# Patient Record
Sex: MALE | Race: WHITE | Employment: FULL TIME | URBAN - METROPOLITAN AREA
[De-identification: names, ages, dates, MRNs, and addresses within clinical notes are randomized per-mention and may not be internally consistent; named-entity substitution may affect disease eponyms.]

---

## 2019-10-24 ENCOUNTER — OFFICE VISIT (OUTPATIENT)
Dept: CARDIOLOGY CLINIC | Age: 49
End: 2019-10-24

## 2019-10-24 ENCOUNTER — HOSPITAL ENCOUNTER (OUTPATIENT)
Dept: LAB | Age: 49
Discharge: HOME | End: 2019-10-24
Payer: COMMERCIAL

## 2019-10-24 VITALS
DIASTOLIC BLOOD PRESSURE: 60 MMHG | WEIGHT: 213 LBS | HEART RATE: 54 BPM | SYSTOLIC BLOOD PRESSURE: 110 MMHG | OXYGEN SATURATION: 96 % | HEIGHT: 72 IN | BODY MASS INDEX: 28.85 KG/M2

## 2019-10-24 DIAGNOSIS — I48.91 ATRIAL FIBRILLATION, UNSPECIFIED TYPE (HCC): ICD-10-CM

## 2019-10-24 DIAGNOSIS — R07.9 CHEST PAIN, UNSPECIFIED TYPE: ICD-10-CM

## 2019-10-24 DIAGNOSIS — I48.91 ATRIAL FIBRILLATION, UNSPECIFIED TYPE (HCC): Primary | ICD-10-CM

## 2019-10-24 LAB
T4 FREE SERPL-MCNC: 0.8 NG/DL (ref 0.7–1.5)
TSH SERPL DL<=0.05 MIU/L-ACNC: 1.54 UIU/ML (ref 0.36–3.74)

## 2019-10-24 PROCEDURE — 36415 COLL VENOUS BLD VENIPUNCTURE: CPT

## 2019-10-24 PROCEDURE — 84439 ASSAY OF FREE THYROXINE: CPT

## 2019-10-24 RX ORDER — VENLAFAXINE HYDROCHLORIDE 75 MG/1
CAPSULE, EXTENDED RELEASE ORAL
COMMUNITY
Start: 2019-10-10

## 2019-10-24 RX ORDER — TAMSULOSIN HYDROCHLORIDE 0.4 MG/1
CAPSULE ORAL
COMMUNITY
Start: 2019-01-11 | End: 2021-03-10

## 2019-10-24 RX ORDER — ATENOLOL 25 MG/1
25 TABLET ORAL DAILY
COMMUNITY
Start: 2018-12-06

## 2019-10-24 NOTE — PROGRESS NOTES
1. Have you been to the ER, urgent care clinic since your last visit? Hospitalized since your last visit? No    2. Have you seen or consulted any other health care providers outside of the [address] [place] [address] since your last visit? Include any pap smears or colon screening.  No

## 2019-10-24 NOTE — PROGRESS NOTES
[facility]    Mr. Samson Mayorga is a 52-year male with a history of hyperthyroidism, paroxysmal atrial fibrillation    Patient is here today to establish care with me. He denies any prior history of myocardial infarction or congestive heart failure. Recently he was diagnosed with atrial fibrillation and had to go to emergency department. Patient has very long-standing history of occasional palpitation on and off for last 20 years. For that reason he has been taking atenolol for palpitation. 2 months ago while he was on vacation, he had a sudden onset of tachycardia and irregular heart rhythm lasting for couple hours. He did not seek medical attention. This happened again couple weeks ago when he decided to go to emergency department where he was found to have atrial fibrillation with rapid medical response. He was converted back to normal spontaneously. Since then he did not have any more episode of palpitation, he denies any associated dizziness, presyncope or syncope during this episodes. Randomly he has some sharp pressure sensation in the chest in the center without any radiation or associated symptoms. This does not happen with exertion. He continues to jog up to 5 to 10 miles without any problem. He denies PND or lower extremity swelling. He is taking Eliquis for stroke prophylaxis without any bleeding problem  Denies any nausea, vomiting, abdominal pain, fever, chills, sputum production. No hematuria or other bleeding complaints    Past Medical History:   Diagnosis Date    Hyperthyroidism     [facility]F [facility]oxysmal [facility] fibrillation) ([facility])          No past surgical history on file. Current Outpatient Medications   Medication Sig    apixaban (ELIQUIS) 5 mg tablet Take 5 mg by mouth.     atenolol (TENORMIN) 25 mg tablet TAKE 2 TABLETS BY MOUTH EVERY DAY    tamsulosin (FLOMAX) 0.4 mg capsule TAKE 1 CAPSULE BY MOUTH EVERY DAY  venlafaxine-SR (EFFEXOR-XR) 75 mg capsule TAKE 1 CAPSULE BY MOUTH EVERY DAY     No current facility-administered medications for this visit. Allergies and Sensitivities:  Allergies   Allergen Reactions    Povidone-Iodine Unknown (comments)     Foods high in iodine (seafood, etc) cause high heart rate r/t hyperthyroidism. Has tolerated topical iodine/betadine in past    Prednisone Unknown (comments)    Propranolol Unknown (comments)     Irregular heartbeats, Cold hands, numbness on face    Shellfish Derived Swelling     'Pt states he has swelling of th throat,when he eat shellfish'       Family History:  No family history on file. Social History:  Social History     Tobacco Use    Smoking status: Never Smoker    Smokeless tobacco: Never Used   Substance Use Topics    Alcohol use: Not on file    Drug use: Not on file     He  reports that he has never smoked. He has never used smokeless tobacco.  He  has no alcohol history on file. Review of Systems:  Cardiac symptoms as noted above in HPI. All others negative. Denies fatigue, malaise, skin rash, joint pain, blurring vision, photophobia, neck pain, hemoptysis, chronic cough, nausea, vomiting, hematuria, burning micturition, BRBPR, chronic headaches. Physical Exam:  BP Readings from Last 3 Encounters:   10/24/19 110/60         Pulse Readings from Last 3 Encounters:   10/24/19 (!) 54          Wt Readings from Last 3 Encounters:   10/24/19 213 lb (96.6 kg)       Constitutional: Oriented to person, place, and time. HENT: Head: Normocephalic and atraumatic. Eyes: Conjunctivae and extraocular motions are normal.   Neck: No JVD present. Carotid bruit is not appreciated. Cardiovascular: Regular rhythm. No murmur, gallop or rubs appreciated  Lung: Breath sounds normal. No respiratory distress. No ronchi or rales appreciated  Abdominal: No tenderness. No rebound and no guarding. Musculoskeletal: There is no lower extremity edema.  No cynosis  Lymphadenopathy:  No cervical or supraclavicular adenopathy appriciated. Neurological: No gross motor deficit noted. Skin: No visible skin rash noted. No Ear discharge noted  Psychiatric: Normal mood and affect. Good distal pulse      Review of Data  LABS:   Lab Results   Component Value Date/Time    Sodium 139 10/29/2010 03:36 PM    Potassium 4.1 10/29/2010 03:36 PM    Chloride 100 10/29/2010 03:36 PM    CO2 32 10/29/2010 03:36 PM    Glucose 115 (H) 10/29/2010 03:36 PM    BUN 16 10/29/2010 03:36 PM    Creatinine 1.2 10/29/2010 03:36 PM     No flowsheet data found. Lab Results   Component Value Date/Time    ALT (SGPT) 40 10/27/2010 04:08 PM     No results found for: HBA1C, HGBE8, FJE6NQJO, ARY4AMWZ    EKG    ECHO    STRESS TEST (EST, PHARM, NUC, ECHO etc)    CATHETERIZATION    IMPRESSION & PLAN:  Mr. Yimi Ashraf is a 49-year-old male with paroxysmal atrial fibrillation and remote history of hyperthyroidism    Atrial fibrillation: This is paroxysmal in nature. This was diagnosed by EKG in October 2019  On exam and by EKG today he is in sinus rhythm. He denies any prior history of diabetes, hypertension, stroke or mini stroke or congestive heart failure or vascular disease. He is currently on atenolol and Eliquis. He said he is taking atenolol for years for history of palpitation however he never had a diagnosis of hypertension  We calculated his chads 2 vascular score. Based on current risk factor profile, his chads 2 vascular score is low. We discussed his risk of stroke versus risk of bleeding being on anticoagulation. We discussed about risk-benefit alternatives of antiplatelet versus anticoagulation for stroke prophylaxis and risk of bleeding. After lengthy discussion, he has decided to continue with Eliquis for stroke prophylaxis despite the bleeding risk.   Echo ordered  TSH ordered  We will proceed with nuclear stress test to rule out ischemia before considering antiarrhythmic medication    Chest pressure:  Somewhat atypical for angina. No exertional symptoms. Able to exercise regularly and jog up to 5 to 10 miles without any problem  We will proceed with nuclear stress test to rule out ischemia before considering antiarrhythmic medication especially class Ic agent    Hyperthyroidism:  Patient had a remote history of hyperthyroidism and was on medication more than 10 years ago. Since then he has been off of medication and his thyroid function has remained stable. With recent A. fib episodes, will order TSH again. Last TSH was in August 2019 and was normal    This plan was discussed with patient who is in agreement. Thank you for allowing me to participate in patient care. Please feel free to call me if you have any question or concern. Kelly Diallo MD  Please note: This document has been produced using voice recognition software. Unrecognized errors in transcription may be present.

## 2019-10-24 NOTE — PATIENT INSTRUCTIONS
Echo/ Nuclear Stress/ TSH-  Please call central scheduling at [phone]      All testing/lab work is completed at [facility]/[address] , [facility]     No appointment required for lab work  Hours are Mon-Fri 7:00 am-5:30 pm

## 2019-11-06 ENCOUNTER — HOSPITAL ENCOUNTER (OUTPATIENT)
Dept: NON INVASIVE DIAGNOSTICS | Age: 49
Discharge: HOME | End: 2019-11-06
Attending: INTERNAL MEDICINE
Payer: COMMERCIAL

## 2019-11-06 ENCOUNTER — HOSPITAL ENCOUNTER (OUTPATIENT)
Dept: NUCLEAR MEDICINE | Age: 49
Discharge: HOME | End: 2019-11-06
Attending: INTERNAL MEDICINE
Payer: COMMERCIAL

## 2019-11-06 VITALS
SYSTOLIC BLOOD PRESSURE: 96 MMHG | HEIGHT: 72 IN | WEIGHT: 208 LBS | BODY MASS INDEX: 28.17 KG/M2 | DIASTOLIC BLOOD PRESSURE: 57 MMHG

## 2019-11-06 VITALS
WEIGHT: 213 LBS | DIASTOLIC BLOOD PRESSURE: 60 MMHG | HEIGHT: 72 IN | SYSTOLIC BLOOD PRESSURE: 110 MMHG | BODY MASS INDEX: 28.85 KG/M2

## 2019-11-06 DIAGNOSIS — R07.9 CHEST PAIN, UNSPECIFIED TYPE: ICD-10-CM

## 2019-11-06 DIAGNOSIS — I48.91 ATRIAL FIBRILLATION, UNSPECIFIED TYPE (HCC): ICD-10-CM

## 2019-11-06 LAB
ECHO AO ASC DIAM: 3.26 CM
ECHO AO ROOT DIAM: 3.2 CM
ECHO IVC SNIFF: 1.67 CM
ECHO LA MAJOR AXIS: 3.45 CM
ECHO LA TO AORTIC ROOT RATIO: 1.08
ECHO LV EDV TEICHHOLZ: 0.57 ML
ECHO LV ESV TEICHHOLZ: 0.17 ML
ECHO LV INTERNAL DIMENSION DIASTOLIC: 4.75 CM (ref 4.2–5.9)
ECHO LV INTERNAL DIMENSION SYSTOLIC: 2.87 CM
ECHO LV IVSD: 0.87 CM (ref 0.6–1)
ECHO LV MASS 2D: 149.2 G (ref 88–224)
ECHO LV MASS INDEX 2D: 68.2 G/M2 (ref 49–115)
ECHO LV POSTERIOR WALL DIASTOLIC: 0.79 CM (ref 0.6–1)
ECHO LVOT DIAM: 2.17 CM
ECHO LVOT PEAK GRADIENT: 4.4 MMHG
ECHO LVOT PEAK VELOCITY: 104.52 CM/S
ECHO LVOT SV: 79.9 ML
ECHO LVOT VTI: 21.53 CM
ECHO MV A VELOCITY: 46.31 CM/S
ECHO MV AREA PHT: 3.1 CM2
ECHO MV E DECELERATION TIME (DT): 246.2 MS
ECHO MV E VELOCITY: 59.96 CM/S
ECHO MV E/A RATIO: 1.29
ECHO MV PRESSURE HALF TIME (PHT): 71.4 MS
ECHO TV REGURGITANT MAX VELOCITY: 206.36 CM/S
ECHO TV REGURGITANT PEAK GRADIENT: 17 MMHG
LVFS 2D: 39.53 %
LVOT MG: 1.94 MMHG
LVOT MV: 0.62 CM/S
LVSV (MOD BI): 34.69 ML
LVSV (MOD SINGLE 4C): 32.85 ML
LVSV (MOD SINGLE): 33.44 ML
LVSV (TEICH): 33.18 ML
MV DEC SLOPE: 2.44
STRESS ANGINA INDEX: 0
STRESS BASELINE HR: 52 BPM
STRESS ESTIMATED WORKLOAD: 8.3 METS
STRESS EXERCISE DUR MIN: NORMAL
STRESS PEAK DIAS BP: 70 MMHG
STRESS PEAK SYS BP: 136 MMHG
STRESS PERCENT HR ACHIEVED: 80 %
STRESS POST PEAK HR: 136 BPM
STRESS RATE PRESSURE PRODUCT: NORMAL BPM*MMHG
STRESS TARGET HR: 169 BPM

## 2019-11-06 PROCEDURE — 93017 CV STRESS TEST TRACING ONLY: CPT

## 2019-11-06 PROCEDURE — A9500 TC99M SESTAMIBI: HCPCS

## 2019-11-06 PROCEDURE — 93306 TTE W/DOPPLER COMPLETE: CPT

## 2019-12-10 ENCOUNTER — TELEPHONE (OUTPATIENT)
Dept: CARDIOLOGY CLINIC | Age: 49
End: 2019-12-10

## 2019-12-10 NOTE — TELEPHONE ENCOUNTER
Contacted pt at [facility] number. Two patient Identifiers confirmed. Advised pt per Dr Sharita Berry. Pt verbalized understanding.

## 2020-01-16 ENCOUNTER — OFFICE VISIT (OUTPATIENT)
Dept: CARDIOLOGY CLINIC | Age: 50
End: 2020-01-16

## 2020-01-16 VITALS
BODY MASS INDEX: 29.02 KG/M2 | HEART RATE: 55 BPM | WEIGHT: 214 LBS | DIASTOLIC BLOOD PRESSURE: 56 MMHG | OXYGEN SATURATION: 99 % | SYSTOLIC BLOOD PRESSURE: 91 MMHG

## 2020-01-16 DIAGNOSIS — I48.91 ATRIAL FIBRILLATION, UNSPECIFIED TYPE (HCC): Primary | ICD-10-CM

## 2020-01-16 NOTE — PATIENT INSTRUCTIONS
Appt with Dr Hayden Farooq per Dr Shirley Clifford pt not in [address] given [facility] view number to contact  [phone]  Cardiac event monitor -[employer] will contact pt

## 2020-01-16 NOTE — PROGRESS NOTES
1. Have you been to the ER, urgent care clinic since your last visit? Hospitalized since your last visit? No    2. Have you seen or consulted any other health care providers outside of the [address] [place] [address] since your last visit? Include any pap smears or colon screening.  No

## 2020-01-16 NOTE — PROGRESS NOTES
[facility]    Mr. Manuel Stewart is a 44 y.o.male with a history of hyperthyroidism, paroxysmal atrial fibrillation    Patient is here today for follow-up appointment. He has undergone echocardiogram and nuclear stress test since last time. Approximately 2 weeks ago, patient had another episode of atrial fibrillation with rapid ventricle response, symptomatic which made him go to emergency department. According to patient he was given IV Cardizem and then he went back to sinus rhythm. Upon further questioning, he admits that he has been feeling some sort of arrhythmia almost on a daily basis however usually short-lived and last less than 10 seconds. He denies any presyncope or syncope he is able to perform activity of daily living as well as exercise without any problems otherwise. Denies any nausea, vomiting, abdominal pain, fever, chills, sputum production. No hematuria or other bleeding complaints    Past Medical History:   Diagnosis Date    Hyperthyroidism     [facility]          History reviewed. No pertinent surgical history. Current Outpatient Medications   Medication Sig    apixaban (ELIQUIS) 5 mg tablet Take 5 mg by mouth.  atenolol (TENORMIN) 25 mg tablet TAKE 2 TABLETS BY MOUTH EVERY DAY    tamsulosin (FLOMAX) 0.4 mg capsule TAKE 1 CAPSULE BY MOUTH EVERY DAY    venlafaxine-SR (EFFEXOR-XR) 75 mg capsule TAKE 1 CAPSULE BY MOUTH EVERY DAY     No current facility-administered medications for this visit. Allergies and Sensitivities:  Allergies   Allergen Reactions    Povidone-Iodine Unknown (comments)     Foods high in iodine (seafood, etc) cause high heart rate r/t hyperthyroidism.   Has tolerated topical iodine/betadine in past    Prednisone Unknown (comments)    Propranolol Unknown (comments)     Irregular heartbeats, Cold hands, numbness on face    Shellfish Derived Swelling 'Pt states he has swelling of th throat,when he eat shellfish'       Family History:  History reviewed. No pertinent family history. Social History:  Social History     Tobacco Use    Smoking status: Never Smoker    Smokeless tobacco: Never Used   Substance Use Topics    Alcohol use: Not on file    Drug use: Not on file     He  reports that he has never smoked. He has never used smokeless tobacco.  He  has no history on file for alcohol. Review of Systems:  Cardiac symptoms as noted above in HPI. All others negative. Denies fatigue, malaise, skin rash, joint pain, blurring vision, photophobia, neck pain, hemoptysis, chronic cough, nausea, vomiting, hematuria, burning micturition, BRBPR, chronic headaches. Physical Exam:  BP Readings from Last 3 Encounters:   11/06/19 110/60   11/06/19 96/57   10/24/19 110/60         Pulse Readings from Last 3 Encounters:   10/24/19 (!) 54          Wt Readings from Last 3 Encounters:   11/06/19 213 lb (96.6 kg)   11/06/19 208 lb (94.3 kg)   10/24/19 213 lb (96.6 kg)       Constitutional: Oriented to person, place, and time. HENT: Head: Normocephalic and atraumatic. Eyes: Conjunctivae and extraocular motions are normal.   Neck: No JVD present. Carotid bruit is not appreciated. Cardiovascular: Regular rhythm. No murmur, gallop or rubs appreciated  Lung: Breath sounds normal. No respiratory distress. No ronchi or rales appreciated  Abdominal: No tenderness. No rebound and no guarding. Musculoskeletal: There is no lower extremity edema. No cynosis  Lymphadenopathy:  No cervical or supraclavicular adenopathy appriciated. Neurological: No gross motor deficit noted. Skin: No visible skin rash noted. No Ear discharge noted  Psychiatric: Normal mood and affect.    Good distal pulse      Review of Data  LABS:   Lab Results   Component Value Date/Time    Sodium 139 10/29/2010 03:36 PM    Potassium 4.1 10/29/2010 03:36 PM    Chloride 100 10/29/2010 03:36 PM    CO2 32 10/29/2010 03:36 PM    Glucose 115 (H) 10/29/2010 03:36 PM    BUN 16 10/29/2010 03:36 PM    Creatinine 1.2 10/29/2010 03:36 PM     No flowsheet data found. Lab Results   Component Value Date/Time    ALT (SGPT) 40 10/27/2010 04:08 PM     No results found for: HBA1C, HGBE8, ARW6GKXL, TFM0JCEQ, PWG4WEIA    EKG    ECHO (11/19)  Left Ventricle Normal cavity size, wall thickness, systolic function (ejection fraction normal) and diastolic function. The estimated ejection fraction is 56 - 60%. Visually measured ejection fraction. No regional wall motion abnormality noted. There is age-appropriate left ventricular diastolic function. Wall Scoring The left ventricular wall motion is normal.            Left Atrium Normal cavity size. Right Ventricle Normal cavity size and global systolic function. Right Atrium Normal cavity size. Aortic Valve Trileaflet valve structure, no stenosis and no regurgitation. Mitral Valve No stenosis. Mitral valve non-specific thickening. Trace regurgitation. Tricuspid Valve Normal valve structure and no stenosis. Trace tricuspid valve regurgitation. Pulmonic Valve Normal valve structure and no stenosis. Trace regurgitation. Aorta Normal aortic root and ascending aorta. STRESS TEST (11/19)  · Baseline ECG: Sinus bradycardia. · Negative stress test.  · Low risk Duke treadmill score. · Gated SPECT: Left ventricular function post-stress was normal. Calculated ejection fraction is 61%. · Left ventricular perfusion is normal.  · There was no convincing evidence of significant reversible defect to suggest on going major ischemia. Inferior defect is most consistent with diaphragmatic attenuation artifact  · Myocardial perfusion imaging supports a low risk stress test.    IMPRESSION & PLAN:  Mr. Stevenson Snyder is a 64-year-old male with paroxysmal atrial fibrillation and remote history of hyperthyroidism    Atrial fibrillation: This is paroxysmal in nature.   This was diagnosed by EKG in October 2019  On exam, he is in sinus rhythm. Currently on atenolol and Eliquis. No side effect from the medication  He continues to have paroxysms of atrial fibrillation with rapid ventricle response. 2 weeks ago he had to go to emergency department. Also on a daily basis he has been feeling some sort of arrhythmia and tachycardia however usually less than a minute. We will place event monitor to identify if his symptoms are correlating with A. fib burden  Depending on event monitor finding, I will consider rhythm control strategy with preferably antiarrhythmic medication. His echocardiogram and nuclear stress test in November 2019 did not show any evidence of ischemia and no structural abnormality of the heart. Depending on event monitor finding and A. fib burden, class Ic antiarrhythmic medication will be considered. We also discussed about A. fib ablation. He is willing to explore any of those option as he is symptomatic  I am requesting him to be seen by our electrophysiology colleague Dr. Tyrone Otero for further recommendation. Hyperthyroidism:  Patient had a remote history of hyperthyroidism and was on medication more than 10 years ago. Since then he has been off of medication and his thyroid function has remained stable. Last TSH in October 2019 and it was normal.    This plan was discussed with patient who is in agreement. Thank you for allowing me to participate in patient care. Please feel free to call me if you have any question or concern. Estefany Kan MD  Please note: This document has been produced using voice recognition software. Unrecognized errors in transcription may be present.

## 2020-02-07 ENCOUNTER — OFFICE VISIT (OUTPATIENT)
Dept: CARDIOLOGY CLINIC | Age: 50
End: 2020-02-07

## 2020-02-07 VITALS
HEART RATE: 57 BPM | HEIGHT: 72 IN | BODY MASS INDEX: 29.66 KG/M2 | WEIGHT: 219 LBS | DIASTOLIC BLOOD PRESSURE: 76 MMHG | OXYGEN SATURATION: 98 % | SYSTOLIC BLOOD PRESSURE: 112 MMHG

## 2020-02-07 DIAGNOSIS — I48.0 PAF (PAROXYSMAL ATRIAL FIBRILLATION) (HCC): Primary | ICD-10-CM

## 2020-02-07 NOTE — PROGRESS NOTES
Krisskandy Sabasalima presents today for   Chief Complaint   Patient presents with    New Patient     Refer for NEP       Kriss Hendrickson preferred language for health care discussion is english/other. Is someone accompanying this pt? no    Is the patient using any DME equipment during [address]? no    Depression Screening:  3 most recent PHQ Screens 2/7/2020   Little interest or pleasure in doing things Not at all   Feeling down, depressed, irritable, or hopeless Not at all   Total Score PHQ 2 0       Learning Assessment:  Learning Assessment 2/7/2020   PRIMARY LEARNER Patient   HIGHEST LEVEL OF EDUCATION - PRIMARY LEARNER  4 YEARS OF COLLEGE   BARRIERS PRIMARY LEARNER NONE   CO-LEARNER CAREGIVER No   CO-LEARNER NAME na   PRIMARY LANGUAGE ENGLISH   LEARNER PREFERENCE PRIMARY READING   ANSWERED BY self   RELATIONSHIP SELF       Abuse Screening:  Abuse Screening Questionnaire 2/7/2020   Do you ever feel afraid of your partner? N   Are you in a relationship with someone who physically or mentally threatens you? N   Is it safe for you to go home? Y       Fall Risk  No flowsheet data found. Pt currently taking Anticoagulant therapy? no    Coordination of Care:  1. Have you been to the ER, urgent care clinic since your last visit? Hospitalized since your last visit? no    2. Have you seen or consulted any other health care providers outside of the [address] [place] [address] since your last visit? Include any pap smears or colon screening.  no

## 2020-02-07 NOTE — PROGRESS NOTES
HPI: A 52-year old male referred by Dr. Aurelia Jones for evaluation for PAF. He has had longstanding palpitations for about 20 years and has been quite an athlete in the past. Over the past few months, he has had multiple episodes of palpitations and seen in the emergency room and diagnosed with PAF. He has been placed on Eliquis. Historically, he had taken atenolol and it seemed to improve. For the past month, he has actually been feeling better. He is able to exercise and job up to 10-15 miles a week and go to the gym with minimal to no limitations. He has never had any syncope. He works as a professor in civil engineering. He is here to discuss options. Impression/Plan:  1. Paroxysmal symptomatic atrial fibrillation with a few episodes within the past few months. He historically has been taking atenolol for previous tachycardia. He is now on Eliquis. Recent echocardiogram November 2019 with normal heart, no LVH. 2. Nuclear stress test November 2019 without obvious ischemia. 3. Remote thyroid disorder, stable. I had a lengthy discussion about atrial fibrillation treatment options as well as stroke prevention options. At this point he will continue with Eliquis and atenolol. We discussed possibly antiarrhythmics such as a class 1c agent or ablation. All of his questions were answered. He is going to talk it over with his wife and based upon how his symptoms progress, make a decision about proceeding. He will therefore follow up with Dr. Vikram Pabon and I will be available if he wants to undergo a procedure. If he takes rhythm management with medication, I would likely start him on first pill-in-pocket either propafenone or flecainide and then move to maintenance therapy if he has breakthrough episodes. Total care time spent in reviewing the case, multiple EMR databases, physician notes, procedure notes, examining the patient, and documentation, is 60 minutes.      Discussed in details with patient.  All questions were answered to their full satisfaction. Risk, benefit and alternatives were discussed. More than 50% time spent in counseling and coordination of care. Past Medical History:   Diagnosis Date    Hyperthyroidism     PAF (paroxysmal atrial fibrillation) ([facility]        Current Outpatient Medications   Medication Sig Dispense Refill    apixaban (ELIQUIS) 5 mg tablet Take 5 mg by mouth.  atenolol (TENORMIN) 25 mg tablet TAKE 2 TABLETS BY MOUTH EVERY DAY      tamsulosin (FLOMAX) 0.4 mg capsule TAKE 1 CAPSULE BY MOUTH EVERY DAY      venlafaxine-SR (EFFEXOR-XR) 75 mg capsule TAKE 1 CAPSULE BY MOUTH EVERY DAY         Social History   reports that he has never smoked. He has never used smokeless tobacco.   has no history on file for alcohol. Family History  family history is not on file. Review of Systems  Except as stated above include:  Constitutional: Negative for fever, chills and malaise/fatigue. HEENT: No congestion or recent URI. Gastrointestinal: No nausea, vomiting, abdominal pain, bloody stools. Pulmonary:  Negative except as stated above. Cardiac:  Negative except as stated above. Musculoskeletal: Negative except as stated above. Neurological:  No localized symptoms. Skin:  Negative except as stated above. Psych:  Negative except as stated above. Endocrine:  Negative except as stated above. PHYSICAL EXAM  BP Readings from Last 3 Encounters:   02/07/20 112/76   01/16/20 91/56   11/06/19 110/60     Pulse Readings from Last 3 Encounters:   02/07/20 (!) 57   01/16/20 (!) 55   10/24/19 (!) 54     Wt Readings from Last 3 Encounters:   02/07/20 99.3 kg (219 lb)   01/16/20 97.1 kg (214 lb)   11/06/19 96.6 kg (213 lb)     General:   Well developed, well groomed. Head/Neck:   No jugular venous distention     No carotid bruits. No evidence of xanthelasma. Lungs:   No respiratory distress. Clear bilaterally. Heart:    Regular rate and rhythm. Normal S1/S2.       Palpation of heart with normal point of maximum impulse. No significant murmurs, rubs or gallops. Abdomen:   Soft and nontender. No palpable abdominal mass or bruits. Extremities:   Intact peripheral pulses. No significant edema. Neurological:   Alert and oriented to person, place, time. No focal neurological deficit visually. Skin:   No obvious rash    Blood Pressure Metric:  Monitor recommended and adjustments stated if needed.

## 2020-03-19 ENCOUNTER — TELEPHONE (OUTPATIENT)
Dept: CARDIOLOGY CLINIC | Age: 50
End: 2020-03-19

## 2020-03-19 NOTE — TELEPHONE ENCOUNTER
Patient would like to be called with event monitor results, he does not wish to come in due to concerns of Covid-19.      Advised patient we will discuss with Dr. Fabiola Peacock and call him with event monitor results

## 2020-03-20 RX ORDER — FLECAINIDE ACETATE 100 MG/1
100 TABLET ORAL 2 TIMES DAILY
COMMUNITY

## 2020-03-20 NOTE — TELEPHONE ENCOUNTER
Verbal order and read back per Nkechi Kim MD  Event monitor essentially normal, only 2-3% of the time. Patient states that he was hospitalized at [facility] for afib 14 hours straight and he had a cardioversion done. And now he is on    flecainide      Also he Atenolol was reduced to 25mg daily        He states now he is feeling well. Has not had any recurrent palpitations or afib since then.      He wants to make appt for next month, he is more open to ablation now, he has done a lot of research

## 2025-07-24 ENCOUNTER — HOSPITAL ENCOUNTER (OUTPATIENT)
Facility: HOSPITAL | Age: 55
Setting detail: RECURRING SERIES
Discharge: HOME | End: 2025-07-27
Payer: COMMERCIAL

## 2025-07-24 PROCEDURE — 97161 PT EVAL LOW COMPLEX 20 MIN: CPT

## 2025-07-24 NOTE — PROGRESS NOTES
PHYSICAL / OCCUPATIONAL THERAPY - DAILY TREATMENT NOTE (updated 3/2025)  For Eval visit    Patient Name: Sharan Akbar    Date: 2025    : 1970  Insurance: Payor: KATIE MALHOTRA / Plan: [facility] [place] / Product Type: *No Product type* /      Patient  verified yes     Visit #   Current / Total 1 16   Time   In / Out 0940 1020   Pain   In / Out 0 0   Subjective Functional Status/Changes: See POC     TREATMENT AREA =  see POC    OBJECTIVE           40 min   Eval - untimed                      Therapeutic Procedures:    Tx Min Billable or 1:1 Min (if diff from Tx Min) Procedure, Rationale, Specifics          Details if applicable:              Details if applicable:            Details if applicable:            Details if applicable:       [facility] Totals Reminder: bill using total billable min of TIMED therapeutic procedures (example: do not include dry needle or estim unattended, both untimed codes, in totals to left)  8-22 min = 1 unit; 23-37 min = 2 units; 38-52 min = 3 units; 53-67 min = 4 units; 68-82 min = 5 units   Total Total     Charge Capture    [x]  Patient Education billed concurrently with other procedures   [x] Review HEP    [] Progressed/Changed HEP, detail:    [] Other detail:       Objective Information/Functional Measures/Assessment    See POC    Patient will continue to benefit from skilled PT / OT services to modify and progress therapeutic interventions, analyze and address functional mobility deficits, analyze and address ROM deficits, analyze and address soft tissue restrictions, analyze and cue for proper movement patterns, and improve pain and pelvic floor function to address functional deficits and attain remaining goals.    Progress toward goals / Updated goals:  [x]  See POC    See POC    Next PN/ RC due 25  Auth due pending post eval    PLAN  yes Continue plan of care  []  Other:      Valeria Orona, PT    2025    8:14 AM  If an interpreting service was utilized for

## 2025-07-24 NOTE — THERAPY EVALUATION
[facility]  [address], [address], [place] [address] Ph:[phone] Fx: [phone]  Plan of Care / Statement of Necessity for Physical Therapy Services     Patient Name: Sharan Akbar : 1970   Medical   Diagnosis: Other specified disorders of muscle [M62.89] Treatment Diagnosis: R39.89  Other signs and symptoms of genitourinary system and M62.89  OTHER SPECIFIED DISORDERS OF MUSCLE    Onset Date:      Referral Source: Chalino Crowe PA Start of Care (SOC): 2025   Prior Hospitalization: See medical history Provider #: [id number]   Prior Level of Function: No pain in pelvis, regular bowel movements   Comorbidities:  Musculoskeletal disorders and Other: A-fib     Subjective / key information:  Sharan Akbar is a 55 y.o. male with complaint of tightness in his bladder/rectum/prostate that began 5 years ago. Pain is in the lower pelvis and feels like a cramp. It is exacerbated by stress.  He has seen GI and URO, had a colonoscopy. Per his report all tests were negative.  Pain is better if he is submerged to his waist in cold water or if he exercises. He uses the weight machines at his gym and jogs. There are times where he can be totally pain free. He denies bladder UI but does endorse some bladder urgency and tightness, especially if he has prolonged the void while busy/occupied with something. He often feels like he needs to strain to full empty his bladder.  BM's occur 3 times per week with reported consistency of type 2 on [place] stool chart. He reports the last 10 years he has been dealing with constipation. He manages this with prune juice. He maintains good fluid intake to manage heart arhythmia. He reports drinking at least 8 glasses of water per day. He denies any changes in sexual function, no pain or changes with erection. Of note he has had 2 surgeries to L5 for a bulging disc with sciatica about 5 years ago.

## 2025-08-01 ENCOUNTER — HOSPITAL ENCOUNTER (OUTPATIENT)
Facility: HOSPITAL | Age: 55
Setting detail: RECURRING SERIES
Discharge: HOME | End: 2025-08-04
Payer: COMMERCIAL

## 2025-08-01 PROCEDURE — 97112 NEUROMUSCULAR REEDUCATION: CPT

## 2025-08-01 PROCEDURE — 97530 THERAPEUTIC ACTIVITIES: CPT

## 2025-08-01 PROCEDURE — 97140 MANUAL THERAPY 1/> REGIONS: CPT

## 2025-08-01 PROCEDURE — 97535 SELF CARE MNGMENT TRAINING: CPT

## 2025-08-08 ENCOUNTER — HOSPITAL ENCOUNTER (OUTPATIENT)
Facility: HOSPITAL | Age: 55
Setting detail: RECURRING SERIES
Discharge: HOME | End: 2025-08-11
Payer: COMMERCIAL

## 2025-08-08 PROCEDURE — 97112 NEUROMUSCULAR REEDUCATION: CPT

## 2025-08-08 PROCEDURE — 97535 SELF CARE MNGMENT TRAINING: CPT

## 2025-08-08 PROCEDURE — 97530 THERAPEUTIC ACTIVITIES: CPT

## 2025-08-15 ENCOUNTER — HOSPITAL ENCOUNTER (OUTPATIENT)
Facility: HOSPITAL | Age: 55
Setting detail: RECURRING SERIES
Discharge: HOME | End: 2025-08-18
Payer: COMMERCIAL

## 2025-08-15 PROCEDURE — 97140 MANUAL THERAPY 1/> REGIONS: CPT

## 2025-08-15 PROCEDURE — 97535 SELF CARE MNGMENT TRAINING: CPT

## 2025-08-15 PROCEDURE — 97112 NEUROMUSCULAR REEDUCATION: CPT

## 2025-08-22 ENCOUNTER — HOSPITAL ENCOUNTER (OUTPATIENT)
Facility: HOSPITAL | Age: 55
Setting detail: RECURRING SERIES
Discharge: HOME | End: 2025-08-25
Payer: COMMERCIAL

## 2025-08-22 PROCEDURE — 97535 SELF CARE MNGMENT TRAINING: CPT

## 2025-08-22 PROCEDURE — 97530 THERAPEUTIC ACTIVITIES: CPT

## 2025-08-22 PROCEDURE — 97140 MANUAL THERAPY 1/> REGIONS: CPT

## 2025-08-29 ENCOUNTER — HOSPITAL ENCOUNTER (OUTPATIENT)
Facility: HOSPITAL | Age: 55
Setting detail: RECURRING SERIES
Discharge: HOME | End: 2025-09-01
Payer: COMMERCIAL

## 2025-08-29 PROCEDURE — 97530 THERAPEUTIC ACTIVITIES: CPT

## 2025-08-29 PROCEDURE — 97140 MANUAL THERAPY 1/> REGIONS: CPT

## 2025-08-29 PROCEDURE — 97535 SELF CARE MNGMENT TRAINING: CPT
